# Patient Record
Sex: FEMALE | Race: WHITE
[De-identification: names, ages, dates, MRNs, and addresses within clinical notes are randomized per-mention and may not be internally consistent; named-entity substitution may affect disease eponyms.]

---

## 2019-03-20 ENCOUNTER — HOSPITAL ENCOUNTER (OUTPATIENT)
Dept: HOSPITAL 53 - M RAD | Age: 29
End: 2019-03-20
Attending: INTERNAL MEDICINE
Payer: COMMERCIAL

## 2019-03-20 DIAGNOSIS — M53.3: Primary | ICD-10-CM

## 2019-03-21 NOTE — REP
Sacrum and SI joint MRI study without contrast:

 

History:  Back pain in the region of the sacroiliac joints.

 

Technique:  Axial, coronal and sagittal imaging planes are acquired with T1 and

T2-weighted scans.

 

MRI findings: There are multiple subcortical cysts associated with the SI joints.

A small quantity of fluid is seen along and within the right SI joint.  The

largest of these cysts is on the left measuring 5 mm.  There is evidence of low

T1 low T2 signal intensity sclerosis and some early spurring along the anterior

margin of the SI joints, particularly on the right.  There are reactive marrow

changes on either side of both sacroiliac joints consistent with chronic

inflammation.  No definite cortical erosion.  There is no evidence of ankylosis.

No bony destructive mass lesion is seen.

 

Impression:

 

Findings consistent with chronic bilateral sacroiliitis.

 

 

Electronically Signed by

Chris Araiza MD 03/21/2019 09:31 A

## 2019-08-16 ENCOUNTER — HOSPITAL ENCOUNTER (OUTPATIENT)
Dept: HOSPITAL 53 - M SFHCPLAZ | Age: 29
End: 2019-08-16
Attending: FAMILY MEDICINE
Payer: COMMERCIAL

## 2019-08-16 DIAGNOSIS — D50.9: Primary | ICD-10-CM

## 2019-08-16 DIAGNOSIS — Z79.899: ICD-10-CM

## 2019-08-16 LAB
ALBUMIN SERPL BCG-MCNC: 3.8 GM/DL (ref 3.2–5.2)
ALT SERPL W P-5'-P-CCNC: 16 U/L (ref 12–78)
BILIRUB SERPL-MCNC: 0.1 MG/DL (ref 0.2–1)
BUN SERPL-MCNC: 9 MG/DL (ref 7–18)
CALCIUM SERPL-MCNC: 9.1 MG/DL (ref 8.5–10.1)
CHLORIDE SERPL-SCNC: 107 MEQ/L (ref 98–107)
CO2 SERPL-SCNC: 29 MEQ/L (ref 21–32)
CREAT SERPL-MCNC: 0.78 MG/DL (ref 0.55–1.3)
FERRITIN SERPL-MCNC: 8 NG/ML (ref 8–252)
GFR SERPL CREATININE-BSD FRML MDRD: > 60 ML/MIN/{1.73_M2} (ref 60–?)
GLUCOSE SERPL-MCNC: 91 MG/DL (ref 70–100)
HCT VFR BLD AUTO: 37 % (ref 36–47)
HGB BLD-MCNC: 11.9 G/DL (ref 12–15.5)
IRON SATN MFR SERPL: 6.1 % (ref 13.2–45)
IRON SERPL-MCNC: 25 UG/DL (ref 50–170)
MCH RBC QN AUTO: 29.5 PG (ref 27–33)
MCHC RBC AUTO-ENTMCNC: 32.2 G/DL (ref 32–36.5)
MCV RBC AUTO: 91.8 FL (ref 80–96)
PLATELET # BLD AUTO: 252 10^3/UL (ref 150–450)
POTASSIUM SERPL-SCNC: 4.6 MEQ/L (ref 3.5–5.1)
PROT SERPL-MCNC: 7.4 GM/DL (ref 6.4–8.2)
RBC # BLD AUTO: 4.03 10^6/UL (ref 4–5.4)
SODIUM SERPL-SCNC: 143 MEQ/L (ref 136–145)
TIBC SERPL-MCNC: 410 UG/DL (ref 250–450)
WBC # BLD AUTO: 5.4 10^3/UL (ref 4–10)

## 2019-10-18 ENCOUNTER — HOSPITAL ENCOUNTER (OUTPATIENT)
Dept: HOSPITAL 53 - M LRY | Age: 29
End: 2019-10-18
Attending: PHYSICIAN ASSISTANT
Payer: COMMERCIAL

## 2019-10-18 DIAGNOSIS — R10.2: ICD-10-CM

## 2019-10-18 DIAGNOSIS — N83.02: Primary | ICD-10-CM

## 2019-10-18 PROCEDURE — 81002 URINALYSIS NONAUTO W/O SCOPE: CPT

## 2019-10-18 PROCEDURE — 76856 US EXAM PELVIC COMPLETE: CPT

## 2019-10-18 PROCEDURE — 93976 VASCULAR STUDY: CPT

## 2019-10-18 PROCEDURE — 81025 URINE PREGNANCY TEST: CPT

## 2019-10-18 PROCEDURE — 76830 TRANSVAGINAL US NON-OB: CPT

## 2019-10-18 NOTE — REP
PELVIC SONOGRAPHY:

 

HISTORY:  Pelvic pain.

 

FINDINGS:  Transvaginal and transabdominal scanning are performed.  Visualized

bladder walls are smooth.  Uterine dimensions are 8.2 x 4.2 x 5.9 cm.

Endometrial echo is 1.1 cm thick.  No focal uterine mass seen.  No free fluid is

noted.

 

Normal size right ovary is seen with some difficulty due to eight posterior

position.  Its dimensions of 2.1 x 2.8 x 3.4 cm.  Left ovary is also normal in

appearance with dimensions of 3.0 x 2.3 x 2.2 cm.  There is a 1.7 cm septated

follicle cyst in the left ovary.

 

IMPRESSION:

 

1.7 cm follicle cyst left ovary.  Normal pelvic sonography.

 

 

Electronically Signed by

Chris Araiza MD 10/18/2019 07:42 P

## 2019-11-20 ENCOUNTER — HOSPITAL ENCOUNTER (OUTPATIENT)
Dept: HOSPITAL 53 - M SFHCPLAZ | Age: 29
End: 2019-11-20
Attending: FAMILY MEDICINE
Payer: COMMERCIAL

## 2019-11-20 DIAGNOSIS — D50.9: Primary | ICD-10-CM

## 2019-11-20 LAB
FERRITIN SERPL-MCNC: 6 NG/ML (ref 8–252)
HCT VFR BLD AUTO: 35.7 % (ref 36–47)
HGB BLD-MCNC: 11.3 G/DL (ref 12–15.5)
IRON SATN MFR SERPL: 10.9 % (ref 13.2–45)
IRON SERPL-MCNC: 47 UG/DL (ref 50–170)
MCH RBC QN AUTO: 28 PG (ref 27–33)
MCHC RBC AUTO-ENTMCNC: 31.7 G/DL (ref 32–36.5)
MCV RBC AUTO: 88.4 FL (ref 80–96)
PLATELET # BLD AUTO: 243 10^3/UL (ref 150–450)
RBC # BLD AUTO: 4.04 10^6/UL (ref 4–5.4)
TIBC SERPL-MCNC: 430 UG/DL (ref 250–450)
WBC # BLD AUTO: 4.8 10^3/UL (ref 4–10)

## 2019-11-20 PROCEDURE — 85027 COMPLETE CBC AUTOMATED: CPT

## 2019-11-20 PROCEDURE — 36415 COLL VENOUS BLD VENIPUNCTURE: CPT

## 2019-11-20 PROCEDURE — 82728 ASSAY OF FERRITIN: CPT

## 2019-11-20 PROCEDURE — 83550 IRON BINDING TEST: CPT

## 2019-11-22 ENCOUNTER — HOSPITAL ENCOUNTER (OUTPATIENT)
Dept: HOSPITAL 53 - M RAD | Age: 29
End: 2019-11-22
Attending: FAMILY MEDICINE
Payer: COMMERCIAL

## 2019-11-22 DIAGNOSIS — Z87.42: ICD-10-CM

## 2019-11-22 DIAGNOSIS — R10.2: Primary | ICD-10-CM

## 2019-11-22 NOTE — REP
PELVIC ULTRASOUND:

 

Real-time sonographic evaluation of pelvis performed utilizing transabdominal and

endovaginal technique.

 

Bladder measures 14.8 x 7.8 x 10.4 cm. Uterus measures 8.4 x 4.0 x 5.2 cm.

Endometrial thickness is 13 mm. Right ovary measures 2.4 x 1.8 x 1.4 cm and left

ovary is somewhat enlarged 3.8 x 2.4 x 3.3 cm. There is no torsion bilaterally

with duplex Doppler evaluation. There is a structure in the left ovary measuring

1.9 x 1.6 x 2.2 cm with an irregular shape, predominately cystic with a thick

wall, likely representing a resolving cyst or dominant follicle. Myometrial

echotexture is somewhat heterogeneous.  scar is noted.

 

IMPRESSION:

 

Irregular cystic structure left ovary, maximum diameter 2.2 cm representing

either resolving cyst or dominant follicle.

 

 

Electronically Signed by

Beck Grimaldo MD 2019 09:20 A

## 2019-12-05 ENCOUNTER — HOSPITAL ENCOUNTER (OUTPATIENT)
Dept: HOSPITAL 53 - M PT | Age: 29
LOS: 26 days | End: 2019-12-31
Attending: FAMILY MEDICINE
Payer: COMMERCIAL

## 2019-12-05 DIAGNOSIS — M53.3: Primary | ICD-10-CM
